# Patient Record
(demographics unavailable — no encounter records)

---

## 2017-03-31 NOTE — ER DOCUMENT REPORT
ED General





- General


Chief Complaint: Chest Pressure


Stated Complaint: HEADACHE


Time seen by provider: 02:51


Mode of Arrival: Ambulatory


Information source: Patient, Relative


TRAVEL OUTSIDE OF THE [place] IN LAST 30 DAYS: No





- HPI


Notes: 


Patient presents with report that she previously had a partial thyroid gland 

removal many years ago and states that occasionally she'll get a right lateral 

neck muscle spasm which she had earlier this morning that went away.  She 

reported no associated chest pain or dyspnea with this.  





Later at 1830, she described a typical ocular migraine where she had visual 

aura followed by a headache that was typical in mild and is now resolving.  No 

head injury, numbness, focal weakness.  No neck stiffness.





Patient later at 2030 noticed some fullness to the chest, without any back pain

, nausea, dyspnea.





Patient states she belched shortly after arrival to the emergency department 

and this chest fullness sensation resolved.  Patient also reports a history of 

swallowing and sometimes feels it takes some time for the food to go down.  She 

has never had an upper endoscopy.  











- Related Data


Allergies/Adverse Reactions: 


 





fluconazole [From Diflucan] Allergy (Verified 06/12/13 12:43)


 


Penicillins Allergy (Verified 06/12/13 12:43)


 


Sulfa (Sulfonamide Antibiotics) Allergy (Verified 06/12/13 12:43)


 











Past Medical History





- General


Information source: Patient


Cannot obtain history due to: Other





- Social History


Smoking Status: Never Smoker


Cigarette use (# per day): No


Chew tobacco use (# tins/day): No


Frequency of alcohol use: None


Drug Abuse: None


Lives with: Family


Family History: CAD - older ages


Patient has suicidal ideation: No


Patient has homicidal ideation: No





- Medical History


Medical History: Other





- Past Medical History


Cardiac Medical History: Reports: Hx Hypertension


   Denies: Hx Heart Attack


Pulmonary Medical History: 


   Denies: Hx Asthma


Neurological Medical History: Denies: Hx Cerebrovascular Accident, Hx Seizures


Renal/ Medical History: Denies: Hx Peritoneal Dialysis


GI Medical History: Reports: Hx Hiatal Hernia, Hx Ulcer.  Denies: Hx Hepatitis


Infectious Medical History: Denies: Hx Hepatitis


Past Surgical History: Denies: Hx Mastectomy, Hx Open Heart Surgery, Hx 

Pacemaker





Review of Systems





- Review of Systems


Notes: 


REVIEW OF SYSTEMS:


CONSTITUTIONAL :  Denies fever,  chills, or sweats.  Denies recent illness.


EENT:   Denies eye, ear, throat, or mouth pain or symptoms.  Denies nasal or 

sinus congestion or discharge.  Denies throat, tongue, or mouth swelling or 

difficulty swallowing.


CARDIOVASCULAR:  Denies palpitations or racing or irregular heart beat.  Denies 

ankle edema.


RESPIRATORY:  Denies cough, cold, or chest congestion.  Denies shortness of 

breath, difficulty breathing, or wheezing.


GASTROINTESTINAL:  Denies abdominal pain or distention.  Denies nausea, vomiting

, or diarrhea.  Denies blood in vomitus, stools, or per rectum.  Denies black, 

tarry stools.  Denies constipation. 


GENITOURINARY:  Denies difficulty urinating, painful urination, burning, 

frequency, blood in urine, or discharge.


FEMALE  GENITOURINARY:  Denies vaginal bleeding, heavy or abnormal periods, 

irregular periods.  Denies vaginal discharge or odor. 


MUSCULOSKELETAL:  Denies back pain or stiffness.  Denies joint pain or swelling.


SKIN:   Denies rash, lesions or sores.


HEMATOLOGIC :   Denies easy bruising or bleeding.


LYMPHATIC:  Denies swollen, enlarged glands.


NEUROLOGICAL:  Denies confusion or altered mental status.  Denies passing out 

or loss of consciousness.  Denies dizziness or lightheadedness.  Denies 

headache.  Denies weakness or paralysis or loss of use of either side.  Denies 

problems with gait or speech.  Denies sensory loss, numbness, or tingling.  

Denies seizures.


PSYCHIATRIC:  Denies anxiety or stress.  Denies depression, suicidal ideation, 

or homicidal ideation.





ALL OTHER SYSTEMS REVIEWED AND NEGATIVE.








Dictation was performed using Dragon voice recognition software 





Physical Exam





- Vital signs


Vitals: 


 











Temp Pulse Resp BP Pulse Ox


 


 98.4 F   61   18   149/70 H  96 


 


 03/30/17 23:06  03/30/17 23:06  03/30/17 23:06  03/30/17 23:06  03/30/17 23:06














- Notes


Notes: 


PHYSICAL EXAMINATION:





GENERAL: Well-appearing, well-nourished and in no acute distress.





HEAD: Atraumatic, normocephalic.





EYES: Pupils equal round and reactive to light, extraocular movements intact, 

conjunctiva are normal.





ENT: Nares patent, oropharynx clear without exudates.  Moist mucous membranes.





NECK: Normal range of motion, supple without lymphadenopathy.  Surgical scar 

from partial thyroidectomy is well-healed.  No mass or other abnormality noted.





LUNGS: Breath sounds clear to auscultation bilaterally and equal.  No wheezes 

rales or rhonchi.





HEART: Regular rate and rhythm without murmurs





ABDOMEN: Soft, nondistended abdomen.  No guarding, no rebound.  No masses 

appreciated.


When I press upon the midepigastric region, the patient has very mild 

reproduction of her chest fullness.





Female : deferred





Musculoskeletal: Normal range of motion, no pitting or edema.  No cyanosis.





NEUROLOGICAL: Cranial nerves grossly intact.  Normal speech, normal gait.  

Normal sensory, motor exams





PSYCH: Normal mood, normal affect.





SKIN: Warm, Dry, normal turgor, no rashes or lesions noted.





Course





- Re-evaluation


Re-evalutation: 


03/31/17 05:08


Patient was given a GI cocktail with lidocaine and Maalox and Pepcid by mouth 

and she had no further recurrence of her chest discomfort or midepigastric 

discomfort.  She was ambulatory without complaint and felt stable for discharge.





She has a follow-up with her cardiologist within the week.





No obvious evidence for acute MI or ischemia.  Symptoms fit with a reflux 

etiology given the symptoms as described in the response to GI cocktail and by 

mouth meds.





Patient describes her previous right-sided neck spasm and her previous 

unrelated headache is consistent with multiple prior episodes.  No clinical 

suggestion for acute intracranial injury or CVA or TIA.  No evidence for 

pneumonia or congestive heart failure or electrolyte imbalance or anemia.





03/31/17 05:16








- Vital Signs


Vital signs: 


 











Temp Pulse Resp BP Pulse Ox


 


 98.0 F   51 L  17   178/75 H  99 


 


 03/31/17 01:17  03/31/17 01:17  03/31/17 01:17  03/31/17 01:17  03/31/17 01:23














- Laboratory


Result Diagrams: 


 03/30/17 23:34





 03/30/17 23:34


Laboratory results interpreted by me: 


 











  03/30/17





  23:34


 


Glucose  118 H














- EKG Interpretation by Me


EKG shows normal: Sinus rhythm


Additional EKG results interpreted by me: 





03/31/17 05:09


EKG as interpreted by me showed normal sinus rhythm heart rate of 56.  There is 

no gross evidence for acute MI or ischemia identified.





Discharge





- Discharge


Clinical Impression: 


Chest pain


Qualifiers:


 Chest pain type: unspecified Qualified Code(s): R07.9 - Chest pain, unspecified





GERD (gastroesophageal reflux disease)


Qualifiers:


 Esophagitis presence: with esophagitis Qualified Code(s): K21.0 - Gastro-

esophageal reflux disease with esophagitis





Condition: Stable


Disposition: HOME, SELF-CARE


Additional Instructions: 


Reflux Disease (GERD)





     Gastro-Esophageal Reflux Disease (GERD) is caused by stomach acid 

refluxing back up into the esophagus. The valve at the end of the esophagus may 

be weak. This is common in persons with a hiatal hernia. GERD symptoms can 

include indigestion, chest pain, heartburn, or food "sticking." Certain foods, 

alcohol, and aspirin can make GERD worse.


     Treatment depends on the severity. Usually, antacids or acid-suppressing 

medicines are used. When the esophagus is acutely inflamed, the physician will 

often prescribe membrane-protective drugs such as Carafate.  Some patients 

benefit from medication such as Reglan that tightens the valve at the top of 

the stomach.


     Avoid those foods that bring on your symptoms. For many people, these 

foods are coffee, chocolate, onions, garlic, and carbonated drinks. Don't use 

alcohol, aspirin, caffeine, or tobacco. Don't eat late at night -- within 4 

hours of bedtime. Don't over-eat. If necessary, elevate the head of your bed 

about 4 inches so that stomach acid will not roll up into your esophagus.


     Call the doctor if you develop severe chest pain, inability to swallow 

fluids, fever, or worsening symptoms.











Chest Pain of Unclear Cause





   The exact cause of your chest pain isn't clear. Fortunately, there is no 

evidence of a dangerous medical condition.  Further testing may be required to 

find the source of the pain.


   Most often, we find that this pain is coming from the chest wall -- the 

muscles or rib joints in the chest.  But chest pain can come from the lung and 

lung lining, the esophagus, the heart valves or heart lining, and even the 

stomach or gallbladder.


   Rest.  Eat lightly until the pain is gone.  We may prescribe medicine for 

pain and inflammation.


   You should call the physician immediately if the pain radiates to the 

shoulder, jaw or arms; if you start to run a fever or develop a cough; or if 

you develop shortness of breath, or other new or alarming symptoms.





If midepigastric pain and difficulty swallowing persist, then you may need an 

upper endoscopy performed.





Prescriptions: 


Ranitidine HCl 150 mg PO BID #60 tablet


Referrals: 


CHIOMA BAIG MD [NO LOCAL MD] - Follow up as needed

## 2017-03-31 NOTE — EKG REPORT
SEVERITY:- ABNORMAL ECG -

SINUS RHYTHM

CONSIDER LEFT VENTRICULAR HYPERTROPHY

:

Confirmed by: Ifeoma Rivero 31-Mar-2017 10:52:41